# Patient Record
Sex: FEMALE | Race: WHITE | NOT HISPANIC OR LATINO | Employment: FULL TIME | URBAN - METROPOLITAN AREA
[De-identification: names, ages, dates, MRNs, and addresses within clinical notes are randomized per-mention and may not be internally consistent; named-entity substitution may affect disease eponyms.]

---

## 2017-01-18 ENCOUNTER — TRANSCRIBE ORDERS (OUTPATIENT)
Dept: ADMINISTRATIVE | Facility: HOSPITAL | Age: 42
End: 2017-01-18

## 2017-01-18 DIAGNOSIS — Z12.31 VISIT FOR SCREENING MAMMOGRAM: Primary | ICD-10-CM

## 2017-02-14 ENCOUNTER — HOSPITAL ENCOUNTER (OUTPATIENT)
Dept: MAMMOGRAPHY | Facility: HOSPITAL | Age: 42
Discharge: HOME | End: 2017-02-14
Admitting: PHYSICIAN ASSISTANT

## 2017-02-14 DIAGNOSIS — Z12.31 VISIT FOR SCREENING MAMMOGRAM: ICD-10-CM

## 2017-02-14 PROCEDURE — 77063 BREAST TOMOSYNTHESIS BI: CPT | Performed by: RADIOLOGY

## 2017-02-14 PROCEDURE — G0202 SCR MAMMO BI INCL CAD: HCPCS

## 2017-02-14 PROCEDURE — 77063 BREAST TOMOSYNTHESIS BI: CPT

## 2017-02-14 PROCEDURE — 77067 SCR MAMMO BI INCL CAD: CPT | Performed by: RADIOLOGY

## 2017-10-05 ENCOUNTER — TRANSCRIBE ORDERS (OUTPATIENT)
Dept: ADMINISTRATIVE | Facility: HOSPITAL | Age: 42
End: 2017-10-05

## 2017-10-05 DIAGNOSIS — R92.8 ABNORMAL MAMMOGRAM: Primary | ICD-10-CM

## 2017-10-09 ENCOUNTER — TRANSCRIBE ORDERS (OUTPATIENT)
Dept: ADMINISTRATIVE | Facility: HOSPITAL | Age: 42
End: 2017-10-09

## 2017-10-09 DIAGNOSIS — IMO0002 MASS: Primary | ICD-10-CM

## 2017-10-24 ENCOUNTER — HOSPITAL ENCOUNTER (OUTPATIENT)
Dept: ULTRASOUND IMAGING | Facility: HOSPITAL | Age: 42
Discharge: HOME | End: 2017-10-24

## 2017-10-24 ENCOUNTER — APPOINTMENT (OUTPATIENT)
Dept: MAMMOGRAPHY | Facility: HOSPITAL | Age: 42
End: 2017-10-24

## 2017-10-24 ENCOUNTER — TRANSCRIBE ORDERS (OUTPATIENT)
Dept: MAMMOGRAPHY | Facility: HOSPITAL | Age: 42
End: 2017-10-24

## 2017-10-24 ENCOUNTER — HOSPITAL ENCOUNTER (OUTPATIENT)
Dept: MAMMOGRAPHY | Facility: HOSPITAL | Age: 42
Discharge: HOME | End: 2017-10-24
Admitting: PHYSICIAN ASSISTANT

## 2017-10-24 DIAGNOSIS — IMO0002 MASS: ICD-10-CM

## 2017-10-24 DIAGNOSIS — Z12.31 VISIT FOR SCREENING MAMMOGRAM: Primary | ICD-10-CM

## 2017-10-24 PROCEDURE — 76642 ULTRASOUND BREAST LIMITED: CPT

## 2017-10-24 PROCEDURE — G0279 TOMOSYNTHESIS, MAMMO: HCPCS

## 2017-10-24 PROCEDURE — 77061 BREAST TOMOSYNTHESIS UNI: CPT | Performed by: RADIOLOGY

## 2017-10-24 PROCEDURE — 76642 ULTRASOUND BREAST LIMITED: CPT | Performed by: RADIOLOGY

## 2017-10-24 PROCEDURE — 77065 DX MAMMO INCL CAD UNI: CPT | Performed by: RADIOLOGY

## 2017-10-24 PROCEDURE — G0206 DX MAMMO INCL CAD UNI: HCPCS

## 2018-02-19 ENCOUNTER — HOSPITAL ENCOUNTER (OUTPATIENT)
Dept: MAMMOGRAPHY | Facility: HOSPITAL | Age: 43
Discharge: HOME | End: 2018-02-19
Admitting: PHYSICIAN ASSISTANT

## 2018-02-19 DIAGNOSIS — Z12.31 VISIT FOR SCREENING MAMMOGRAM: ICD-10-CM

## 2018-02-19 PROCEDURE — 77063 BREAST TOMOSYNTHESIS BI: CPT

## 2018-02-19 PROCEDURE — 77067 SCR MAMMO BI INCL CAD: CPT

## 2018-02-19 PROCEDURE — 77067 SCR MAMMO BI INCL CAD: CPT | Performed by: RADIOLOGY

## 2018-02-19 PROCEDURE — 77063 BREAST TOMOSYNTHESIS BI: CPT | Performed by: RADIOLOGY

## 2019-06-07 ENCOUNTER — TRANSCRIBE ORDERS (OUTPATIENT)
Dept: ADMINISTRATIVE | Facility: HOSPITAL | Age: 44
End: 2019-06-07

## 2019-06-07 DIAGNOSIS — Z12.31 VISIT FOR SCREENING MAMMOGRAM: Primary | ICD-10-CM

## 2019-07-17 ENCOUNTER — APPOINTMENT (OUTPATIENT)
Dept: MAMMOGRAPHY | Facility: HOSPITAL | Age: 44
End: 2019-07-17

## 2019-08-27 ENCOUNTER — HOSPITAL ENCOUNTER (OUTPATIENT)
Dept: MAMMOGRAPHY | Facility: HOSPITAL | Age: 44
Discharge: HOME | End: 2019-08-27
Admitting: PHYSICIAN ASSISTANT

## 2019-08-27 DIAGNOSIS — Z12.31 VISIT FOR SCREENING MAMMOGRAM: ICD-10-CM

## 2019-08-27 PROCEDURE — 77067 SCR MAMMO BI INCL CAD: CPT

## 2019-08-27 PROCEDURE — 77063 BREAST TOMOSYNTHESIS BI: CPT

## 2019-08-27 PROCEDURE — 77063 BREAST TOMOSYNTHESIS BI: CPT | Performed by: RADIOLOGY

## 2019-08-27 PROCEDURE — 77067 SCR MAMMO BI INCL CAD: CPT | Performed by: RADIOLOGY

## 2019-09-19 ENCOUNTER — HOSPITAL ENCOUNTER (OUTPATIENT)
Dept: ULTRASOUND IMAGING | Facility: HOSPITAL | Age: 44
Discharge: HOME | End: 2019-09-19

## 2019-09-19 ENCOUNTER — HOSPITAL ENCOUNTER (OUTPATIENT)
Dept: MAMMOGRAPHY | Facility: HOSPITAL | Age: 44
Discharge: HOME | End: 2019-09-19
Admitting: RADIOLOGY

## 2019-09-19 DIAGNOSIS — R92.8 ABNORMAL MAMMOGRAM: ICD-10-CM

## 2019-09-19 PROCEDURE — 76642 ULTRASOUND BREAST LIMITED: CPT

## 2019-09-19 PROCEDURE — 77066 DX MAMMO INCL CAD BI: CPT

## 2019-09-19 PROCEDURE — 77062 BREAST TOMOSYNTHESIS BI: CPT | Performed by: RADIOLOGY

## 2019-09-19 PROCEDURE — 77066 DX MAMMO INCL CAD BI: CPT | Performed by: RADIOLOGY

## 2019-09-19 PROCEDURE — G0279 TOMOSYNTHESIS, MAMMO: HCPCS

## 2019-09-19 PROCEDURE — 76642 ULTRASOUND BREAST LIMITED: CPT | Performed by: RADIOLOGY

## 2020-11-11 ENCOUNTER — TRANSCRIBE ORDERS (OUTPATIENT)
Dept: ADMINISTRATIVE | Facility: HOSPITAL | Age: 45
End: 2020-11-11

## 2020-11-11 DIAGNOSIS — Z12.31 VISIT FOR SCREENING MAMMOGRAM: Primary | ICD-10-CM

## 2022-11-21 RX ORDER — NORETHINDRONE ACETATE AND ETHINYL ESTRADIOL 1MG-20(21)
KIT ORAL
COMMUNITY
Start: 2014-07-08 | End: 2022-12-28

## 2022-11-21 RX ORDER — ESCITALOPRAM OXALATE 10 MG/1
10 TABLET ORAL DAILY
COMMUNITY
Start: 2022-08-14 | End: 2022-11-21 | Stop reason: SDUPTHER

## 2022-11-21 RX ORDER — ESCITALOPRAM OXALATE 10 MG/1
10 TABLET ORAL DAILY
Qty: 30 TABLET | Refills: 0 | Status: SHIPPED | OUTPATIENT
Start: 2022-11-21 | End: 2022-12-20 | Stop reason: SDUPTHER

## 2022-11-21 RX ORDER — ESCITALOPRAM OXALATE 10 MG/1
TABLET ORAL
Qty: 90 TABLET | Refills: 0 | OUTPATIENT
Start: 2022-11-21

## 2022-11-21 NOTE — TELEPHONE ENCOUNTER
Called patient scheduled physical for 12/15/22 that was the earliest she will run out of lexapro in 2 days goes to [facility] in [facility]

## 2022-11-21 NOTE — TELEPHONE ENCOUNTER
Refill request for lexapro. Has not been seen since 11/24/21. Due for for physical 11/24/22. Should not run out until then. Please schedule

## 2022-12-20 ENCOUNTER — TELEPHONE (OUTPATIENT)
Dept: FAMILY MEDICINE CLINIC | Facility: CLINIC | Age: 47
End: 2022-12-20

## 2022-12-20 RX ORDER — ESCITALOPRAM OXALATE 10 MG/1
10 TABLET ORAL DAILY
Qty: 30 TABLET | Refills: 0 | Status: SHIPPED | OUTPATIENT
Start: 2022-12-20 | End: 2022-12-28 | Stop reason: SDUPTHER

## 2022-12-20 NOTE — TELEPHONE ENCOUNTER
Patient will be out of LEXAPRO before her appointment we had to reschedule. Can you please refill medication till patient is able to be seen on 12.28.22. Thanks.

## 2022-12-28 ENCOUNTER — OFFICE VISIT (OUTPATIENT)
Dept: FAMILY MEDICINE CLINIC | Facility: CLINIC | Age: 47
End: 2022-12-28

## 2022-12-28 VITALS
WEIGHT: 222 LBS | HEART RATE: 78 BPM | SYSTOLIC BLOOD PRESSURE: 122 MMHG | OXYGEN SATURATION: 99 % | HEIGHT: 69 IN | BODY MASS INDEX: 32.88 KG/M2 | DIASTOLIC BLOOD PRESSURE: 92 MMHG

## 2022-12-28 DIAGNOSIS — Z13.1 SCREENING FOR DIABETES MELLITUS: ICD-10-CM

## 2022-12-28 DIAGNOSIS — Z71.3 DIETARY COUNSELING AND SURVEILLANCE: ICD-10-CM

## 2022-12-28 DIAGNOSIS — F41.1 GENERALIZED ANXIETY DISORDER: ICD-10-CM

## 2022-12-28 DIAGNOSIS — E56.9 VITAMIN DEFICIENCY: ICD-10-CM

## 2022-12-28 DIAGNOSIS — Z13.220 SCREENING FOR LIPID DISORDERS: ICD-10-CM

## 2022-12-28 DIAGNOSIS — Z12.11 SCREENING FOR COLON CANCER: ICD-10-CM

## 2022-12-28 DIAGNOSIS — Z00.00 ROUTINE MEDICAL EXAM: Primary | ICD-10-CM

## 2022-12-28 DIAGNOSIS — J30.1 NON-SEASONAL ALLERGIC RHINITIS DUE TO POLLEN: ICD-10-CM

## 2022-12-28 PROCEDURE — 99396 PREV VISIT EST AGE 40-64: CPT | Performed by: NURSE PRACTITIONER

## 2022-12-28 RX ORDER — CETIRIZINE HYDROCHLORIDE 10 MG/1
10 TABLET ORAL DAILY
Qty: 90 TABLET | Refills: 3 | Status: SHIPPED | OUTPATIENT
Start: 2022-12-28

## 2022-12-28 RX ORDER — ESCITALOPRAM OXALATE 10 MG/1
10 TABLET ORAL DAILY
Qty: 90 TABLET | Refills: 3 | Status: SHIPPED | OUTPATIENT
Start: 2022-12-28

## 2022-12-28 RX ORDER — ALBUTEROL SULFATE 90 UG/1
AEROSOL, METERED RESPIRATORY (INHALATION)
COMMUNITY
Start: 2022-12-18

## 2022-12-28 RX ORDER — MONTELUKAST SODIUM 10 MG/1
10 TABLET ORAL NIGHTLY
Qty: 90 TABLET | Refills: 3 | Status: SHIPPED | OUTPATIENT
Start: 2022-12-28

## 2022-12-28 RX ORDER — SEMAGLUTIDE 0.25 MG/.5ML
0.25 INJECTION, SOLUTION SUBCUTANEOUS WEEKLY
Qty: 2 ML | Refills: 0 | Status: SHIPPED | OUTPATIENT
Start: 2022-12-28 | End: 2023-01-10 | Stop reason: SDUPTHER

## 2022-12-28 NOTE — PROGRESS NOTES
"Chief Complaint  Annual Exam    Subjective          Cami Back presents to [facility] for preventative yearly exam.   History of Present Illness  Pt is here for a physical exam and medication refills. She is taking Lexapro and doing well. She has had allergies that seem worse than usual. She has tried to lose weight with diet and exercise but has not been able to do so.       Objective   Vital Signs:   /92   Pulse 78   Ht 175.3 cm (69\")   Wt 101 kg (222 lb)   SpO2 99%   BMI 32.78 kg/m²     Body mass index is 32.78 kg/m².    Predictive Model Details   No score data available for Risk of Fall        PHQ-9 Depression Screening  Little interest or pleasure in doing things? 0-->not at all   Feeling down, depressed, or hopeless? 0-->not at all   Trouble falling or staying asleep, or sleeping too much?     Feeling tired or having little energy?     Poor appetite or overeating?     Feeling bad about yourself - or that you are a failure or have let yourself or your family down?     Trouble concentrating on things, such as reading the newspaper or watching television?     Moving or speaking so slowly that other people could have noticed? Or the opposite - being so fidgety or restless that you have been moving around a lot more than usual?     Thoughts that you would be better off dead, or of hurting yourself in some way?     PHQ-9 Total Score 0   If you checked off any problems, how difficult have these problems made it for you to do your work, take care of things at home, or get along with other people?       Health Maintenance   Topic Date Due   • COLORECTAL CANCER SCREENING  Never done   • HEPATITIS C SCREENING  Never done   • COVID-19 Vaccine (3 - Booster for Moderna series) 12/28/2023 (Originally 4/30/2021)   • ANNUAL PHYSICAL  12/28/2023   • PAP SMEAR  06/28/2025   • TDAP/TD VACCINES (2 - Td or Tdap) 07/14/2026   • INFLUENZA VACCINE  Completed   • Pneumococcal Vaccine " 0-64  Aged Out        Immunization History   Administered Date(s) Administered   • COVID-19 (MODERNA) 1st, 2nd, 3rd Dose Only 02/05/2021, 03/05/2021   • Hepatitis A 09/13/2018, 03/21/2019   • Influenza, Unspecified 10/01/2007, 10/03/2019, 10/06/2020, 10/06/2021, 10/05/2022   • Tdap 07/14/2016       Review of Systems   Constitutional: Negative for fatigue and fever.   Respiratory: Negative for shortness of breath.    Cardiovascular: Negative for chest pain, palpitations and leg swelling.   Neurological: Negative for syncope.   Psychiatric/Behavioral: The patient is not nervous/anxious.         Past History:  Medical History: has a past medical history of Anxiety.   Surgical History: has no past surgical history on file.   Family History: family history includes Breast cancer (age of onset: 65) in her mother; Breast cancer (age of onset: 78) in her maternal grandmother; Ovarian cancer in her maternal grandmother.   Social History: reports that she has never smoked. She has never used smokeless tobacco. She reports current alcohol use. She reports that she does not use drugs.       Allergies: Bee venom    Physical Exam  Constitutional:       Appearance: Normal appearance.   HENT:      Head: Normocephalic.   Eyes:      Conjunctiva/sclera: Conjunctivae normal.      Pupils: Pupils are equal, round, and reactive to light.   Cardiovascular:      Rate and Rhythm: Normal rate and regular rhythm.      Heart sounds: Normal heart sounds.   Pulmonary:      Effort: Pulmonary effort is normal.      Breath sounds: Normal breath sounds.   Abdominal:      Tenderness: There is no abdominal tenderness.   Musculoskeletal:         General: Normal range of motion.   Skin:     General: Skin is warm and dry.      Capillary Refill: Capillary refill takes less than 2 seconds.   Neurological:      General: No focal deficit present.      Mental Status: She is alert and oriented to person, place, and time.   Psychiatric:         Mood and Affect:  Mood normal.         Behavior: Behavior normal.         Thought Content: Thought content normal.         Judgment: Judgment normal.          Result Review :                   Assessment and Plan    Diagnoses and all orders for this visit:    1. Routine medical exam (Primary)  Comments:  We discussed diet, exercise, and prev counseling. UTD on mammo/pap. Cologuard ordered.   Orders:  -     CBC & Differential; Future  -     Comprehensive Metabolic Panel; Future  -     TSH; Future    2. Screening for diabetes mellitus  -     Hemoglobin A1c; Future    3. Screening for lipid disorders  -     Lipid Panel; Future    4. Vitamin deficiency  -     Vitamin B12; Future  -     Vitamin D,25-Hydroxy; Future  -     Folate; Future    5. Screening for colon cancer  -     Cologuard - Stool, Per Rectum; Future    6. Generalized anxiety disorder  Comments:  Continue current meds.   Orders:  -     escitalopram (LEXAPRO) 10 MG tablet; Take 1 tablet by mouth Daily.  Dispense: 90 tablet; Refill: 3    7. Non-seasonal allergic rhinitis due to pollen  Comments:  Try Singulair. Continue Zyrtec. RTC if not improving in 1 month.   Orders:  -     montelukast (Singulair) 10 MG tablet; Take 1 tablet by mouth Every Night.  Dispense: 90 tablet; Refill: 3  -     cetirizine (zyrTEC) 10 MG tablet; Take 1 tablet by mouth Daily.  Dispense: 90 tablet; Refill: 3    8. Dietary counseling and surveillance  Comments:  Begin Wegovy. 1200 ray/day diet and exercise 3-5 days per week.   Orders:  -     Semaglutide-Weight Management (Wegovy) 0.25 MG/0.5ML solution auto-injector; Inject 0.25 mg under the skin into the appropriate area as directed 1 (One) Time Per Week.  Dispense: 2 mL; Refill: 0    9. BMI 32.0-32.9,adult  -     Semaglutide-Weight Management (Wegovy) 0.25 MG/0.5ML solution auto-injector; Inject 0.25 mg under the skin into the appropriate area as directed 1 (One) Time Per Week.  Dispense: 2 mL; Refill: 0                Current Outpatient Medications:    •  albuterol sulfate  (90 Base) MCG/ACT inhaler, INHALE 2 PUFFS BY MOUTH EVERY 4 TO 6 HOURS AS NEEDED FOR WHEEZING /SHORTNESS OF AIR, Disp: , Rfl:   •  escitalopram (LEXAPRO) 10 MG tablet, Take 1 tablet by mouth Daily., Disp: 90 tablet, Rfl: 3  •  cetirizine (zyrTEC) 10 MG tablet, Take 1 tablet by mouth Daily., Disp: 90 tablet, Rfl: 3  •  montelukast (Singulair) 10 MG tablet, Take 1 tablet by mouth Every Night., Disp: 90 tablet, Rfl: 3  •  Semaglutide-Weight Management (Wegovy) 0.25 MG/0.5ML solution auto-injector, Inject 0.25 mg under the skin into the appropriate area as directed 1 (One) Time Per Week., Disp: 2 mL, Rfl: 0    Follow Up   Return in about 1 year (around 12/28/2023) for Annual physical.  Patient was given instructions and counseling regarding her condition or for health maintenance advice. Please see specific information pulled into the AVS if appropriate.     Blanca Torres, LAYTON

## 2023-01-02 ENCOUNTER — PATIENT MESSAGE (OUTPATIENT)
Dept: FAMILY MEDICINE CLINIC | Facility: CLINIC | Age: 48
End: 2023-01-02
Payer: COMMERCIAL

## 2023-01-02 DIAGNOSIS — Z71.3 DIETARY COUNSELING AND SURVEILLANCE: ICD-10-CM

## 2023-01-10 RX ORDER — SEMAGLUTIDE 0.25 MG/.5ML
0.25 INJECTION, SOLUTION SUBCUTANEOUS WEEKLY
Qty: 2 ML | Refills: 0 | Status: SHIPPED | OUTPATIENT
Start: 2023-01-10 | End: 2023-01-10

## 2023-01-10 RX ORDER — SEMAGLUTIDE 0.5 MG/.5ML
0.5 INJECTION, SOLUTION SUBCUTANEOUS WEEKLY
Qty: 2 ML | Refills: 0 | Status: SHIPPED | OUTPATIENT
Start: 2023-01-10 | End: 2023-01-26

## 2023-01-10 NOTE — TELEPHONE ENCOUNTER
Can you send in the 5mg? I resent what we had in her chart, but realized this is not what she needed.

## 2023-01-16 ENCOUNTER — LAB (OUTPATIENT)
Dept: FAMILY MEDICINE CLINIC | Facility: CLINIC | Age: 48
End: 2023-01-16
Payer: COMMERCIAL

## 2023-01-16 DIAGNOSIS — E56.9 VITAMIN DEFICIENCY: ICD-10-CM

## 2023-01-16 DIAGNOSIS — Z00.00 ROUTINE MEDICAL EXAM: ICD-10-CM

## 2023-01-16 DIAGNOSIS — Z13.220 SCREENING FOR LIPID DISORDERS: ICD-10-CM

## 2023-01-16 DIAGNOSIS — Z13.1 SCREENING FOR DIABETES MELLITUS: ICD-10-CM

## 2023-01-16 PROCEDURE — 36415 COLL VENOUS BLD VENIPUNCTURE: CPT | Performed by: NURSE PRACTITIONER

## 2023-01-17 LAB
ALBUMIN SERPL-MCNC: 4.4 G/DL (ref 3.8–4.8)
ALBUMIN/GLOB SERPL: 2 {RATIO} (ref 1.2–2.2)
ALP SERPL-CCNC: 97 IU/L (ref 44–121)
ALT SERPL-CCNC: 19 IU/L (ref 0–32)
AST SERPL-CCNC: 25 IU/L (ref 0–40)
BASOPHILS # BLD AUTO: 0 X10E3/UL (ref 0–0.2)
BASOPHILS NFR BLD AUTO: 1 %
BILIRUB SERPL-MCNC: 1 MG/DL (ref 0–1.2)
BUN SERPL-MCNC: 7 MG/DL (ref 6–24)
BUN/CREAT SERPL: 9 (ref 9–23)
CALCIUM SERPL-MCNC: 9.4 MG/DL (ref 8.7–10.2)
CHLORIDE SERPL-SCNC: 103 MMOL/L (ref 96–106)
CHOLEST SERPL-MCNC: 156 MG/DL (ref 100–199)
CO2 SERPL-SCNC: 21 MMOL/L (ref 20–29)
CREAT SERPL-MCNC: 0.78 MG/DL (ref 0.57–1)
EGFRCR SERPLBLD CKD-EPI 2021: 94 ML/MIN/1.73
EOSINOPHIL # BLD AUTO: 0.1 X10E3/UL (ref 0–0.4)
EOSINOPHIL NFR BLD AUTO: 1 %
ERYTHROCYTE [DISTWIDTH] IN BLOOD BY AUTOMATED COUNT: 12.3 % (ref 11.7–15.4)
FOLATE SERPL-MCNC: >20 NG/ML
GLOBULIN SER CALC-MCNC: 2.2 G/DL (ref 1.5–4.5)
GLUCOSE SERPL-MCNC: 81 MG/DL (ref 70–99)
HBA1C MFR BLD: 5 % (ref 4.8–5.6)
HCT VFR BLD AUTO: 43.6 % (ref 34–46.6)
HDLC SERPL-MCNC: 52 MG/DL
HGB BLD-MCNC: 14.5 G/DL (ref 11.1–15.9)
IMM GRANULOCYTES # BLD AUTO: 0 X10E3/UL (ref 0–0.1)
IMM GRANULOCYTES NFR BLD AUTO: 0 %
LDLC SERPL CALC-MCNC: 87 MG/DL (ref 0–99)
LYMPHOCYTES # BLD AUTO: 1.5 X10E3/UL (ref 0.7–3.1)
LYMPHOCYTES NFR BLD AUTO: 21 %
MCH RBC QN AUTO: 31.5 PG (ref 26.6–33)
MCHC RBC AUTO-ENTMCNC: 33.3 G/DL (ref 31.5–35.7)
MCV RBC AUTO: 95 FL (ref 79–97)
MONOCYTES # BLD AUTO: 0.6 X10E3/UL (ref 0.1–0.9)
MONOCYTES NFR BLD AUTO: 8 %
NEUTROPHILS # BLD AUTO: 4.9 X10E3/UL (ref 1.4–7)
NEUTROPHILS NFR BLD AUTO: 69 %
PLATELET # BLD AUTO: 272 X10E3/UL (ref 150–450)
POTASSIUM SERPL-SCNC: 4.6 MMOL/L (ref 3.5–5.2)
PROT SERPL-MCNC: 6.6 G/DL (ref 6–8.5)
RBC # BLD AUTO: 4.6 X10E6/UL (ref 3.77–5.28)
SODIUM SERPL-SCNC: 140 MMOL/L (ref 134–144)
TRIGL SERPL-MCNC: 88 MG/DL (ref 0–149)
TSH SERPL DL<=0.005 MIU/L-ACNC: 1.21 UIU/ML (ref 0.45–4.5)
VIT B12 SERPL-MCNC: 1158 PG/ML (ref 232–1245)
VLDLC SERPL CALC-MCNC: 17 MG/DL (ref 5–40)
WBC # BLD AUTO: 7.1 X10E3/UL (ref 3.4–10.8)

## 2023-01-18 LAB — 25(OH)D3+25(OH)D2 SERPL-MCNC: 43.8 NG/ML (ref 30–100)

## 2023-01-25 RX ORDER — SEMAGLUTIDE 0.5 MG/.5ML
0.5 INJECTION, SOLUTION SUBCUTANEOUS WEEKLY
Qty: 2 ML | Refills: 0 | Status: CANCELLED | OUTPATIENT
Start: 2023-01-25

## 2023-01-26 RX ORDER — SEMAGLUTIDE 1 MG/.5ML
1 INJECTION, SOLUTION SUBCUTANEOUS WEEKLY
Qty: 2 ML | Refills: 0 | Status: SHIPPED | OUTPATIENT
Start: 2023-01-26 | End: 2023-03-02

## 2023-02-27 RX ORDER — SEMAGLUTIDE 1 MG/.5ML
1 INJECTION, SOLUTION SUBCUTANEOUS WEEKLY
Qty: 2 ML | Refills: 0 | OUTPATIENT
Start: 2023-02-27

## 2023-03-02 RX ORDER — SEMAGLUTIDE 1 MG/.5ML
1 INJECTION, SOLUTION SUBCUTANEOUS WEEKLY
Qty: 2 ML | Refills: 0 | OUTPATIENT
Start: 2023-03-02

## 2023-03-02 RX ORDER — SEMAGLUTIDE 1.7 MG/.75ML
1.7 INJECTION, SOLUTION SUBCUTANEOUS WEEKLY
Qty: 3 ML | Refills: 0 | Status: SHIPPED | OUTPATIENT
Start: 2023-03-02 | End: 2023-03-31

## 2023-03-30 RX ORDER — SEMAGLUTIDE 1.7 MG/.75ML
1.7 INJECTION, SOLUTION SUBCUTANEOUS WEEKLY
Qty: 3 ML | Refills: 0 | Status: CANCELLED | OUTPATIENT
Start: 2023-03-30

## 2023-03-31 RX ORDER — SEMAGLUTIDE 2.4 MG/.75ML
2.4 INJECTION, SOLUTION SUBCUTANEOUS WEEKLY
Qty: 3 ML | Refills: 2 | Status: SHIPPED | OUTPATIENT
Start: 2023-03-31

## 2023-06-09 RX ORDER — SEMAGLUTIDE 2.4 MG/.75ML
INJECTION, SOLUTION SUBCUTANEOUS
Qty: 4 ML | Refills: 0 | Status: SHIPPED | OUTPATIENT
Start: 2023-06-09

## 2023-12-13 RX ORDER — SEMAGLUTIDE 2.4 MG/.75ML
2.4 INJECTION, SOLUTION SUBCUTANEOUS WEEKLY
Qty: 3 ML | Refills: 1 | Status: SHIPPED | OUTPATIENT
Start: 2023-12-13

## 2023-12-28 RX ORDER — SEMAGLUTIDE 2.4 MG/.75ML
2.4 INJECTION, SOLUTION SUBCUTANEOUS WEEKLY
Qty: 3 ML | Refills: 0 | Status: SHIPPED | OUTPATIENT
Start: 2023-12-28

## 2024-01-15 ENCOUNTER — OFFICE VISIT (OUTPATIENT)
Dept: FAMILY MEDICINE CLINIC | Facility: CLINIC | Age: 49
End: 2024-01-15
Payer: COMMERCIAL

## 2024-01-15 VITALS
OXYGEN SATURATION: 100 % | WEIGHT: 155 LBS | DIASTOLIC BLOOD PRESSURE: 90 MMHG | BODY MASS INDEX: 22.96 KG/M2 | SYSTOLIC BLOOD PRESSURE: 122 MMHG | HEART RATE: 100 BPM | HEIGHT: 69 IN

## 2024-01-15 DIAGNOSIS — E55.9 VITAMIN D DEFICIENCY: ICD-10-CM

## 2024-01-15 DIAGNOSIS — J30.1 NON-SEASONAL ALLERGIC RHINITIS DUE TO POLLEN: ICD-10-CM

## 2024-01-15 DIAGNOSIS — Z13.220 SCREENING FOR LIPID DISORDERS: ICD-10-CM

## 2024-01-15 DIAGNOSIS — E56.9 VITAMIN DEFICIENCY: ICD-10-CM

## 2024-01-15 DIAGNOSIS — F41.1 GENERALIZED ANXIETY DISORDER: ICD-10-CM

## 2024-01-15 DIAGNOSIS — Z71.3 DIETARY COUNSELING AND SURVEILLANCE: ICD-10-CM

## 2024-01-15 DIAGNOSIS — Z00.00 ROUTINE MEDICAL EXAM: Primary | ICD-10-CM

## 2024-01-15 DIAGNOSIS — Z13.1 SCREENING FOR DIABETES MELLITUS: ICD-10-CM

## 2024-01-15 PROCEDURE — 99396 PREV VISIT EST AGE 40-64: CPT | Performed by: NURSE PRACTITIONER

## 2024-01-15 RX ORDER — ESCITALOPRAM OXALATE 10 MG/1
10 TABLET ORAL DAILY
Qty: 90 TABLET | Refills: 3 | Status: SHIPPED | OUTPATIENT
Start: 2024-01-15

## 2024-01-15 RX ORDER — CETIRIZINE HYDROCHLORIDE 10 MG/1
10 TABLET ORAL DAILY
Qty: 90 TABLET | Refills: 3 | Status: SHIPPED | OUTPATIENT
Start: 2024-01-15

## 2024-01-15 RX ORDER — SEMAGLUTIDE 2.4 MG/.75ML
2.4 INJECTION, SOLUTION SUBCUTANEOUS WEEKLY
Qty: 3 ML | Refills: 5 | Status: SHIPPED | OUTPATIENT
Start: 2024-01-15

## 2024-01-15 NOTE — PROGRESS NOTES
"Chief Complaint  weight management and Annual Exam    Subjective          Cami Back presents to [facility] for preventative yearly exam.   History of Present Illness  Pt is here for a physical exam and medication refills. She is doing well on Wegovy. She is watching her diet and exercising. She has had menstrual migraines for the past year.       Objective   Vital Signs:   /90   Pulse 100   Ht 175.3 cm (69\")   Wt 70.3 kg (155 lb)   SpO2 100%   BMI 22.89 kg/m²     Body mass index is 22.89 kg/m².    Predictive Model Details   No score data available for Risk of Fall        PHQ-9 Depression Screening  Little interest or pleasure in doing things?     Feeling down, depressed, or hopeless?     Trouble falling or staying asleep, or sleeping too much?     Feeling tired or having little energy?     Poor appetite or overeating?     Feeling bad about yourself - or that you are a failure or have let yourself or your family down?     Trouble concentrating on things, such as reading the newspaper or watching television?     Moving or speaking so slowly that other people could have noticed? Or the opposite - being so fidgety or restless that you have been moving around a lot more than usual?     Thoughts that you would be better off dead, or of hurting yourself in some way?     PHQ-9 Total Score     If you checked off any problems, how difficult have these problems made it for you to do your work, take care of things at home, or get along with other people?       Health Maintenance   Topic Date Due    HEPATITIS C SCREENING  Never done    INFLUENZA VACCINE  08/01/2023    COVID-19 Vaccine (3 - 2023-24 season) 09/01/2023    ANNUAL PHYSICAL  12/28/2023    PAP SMEAR  06/28/2025    COLORECTAL CANCER SCREENING  01/04/2026    TDAP/TD VACCINES (2 - Td or Tdap) 07/14/2026    Pneumococcal Vaccine 0-64  Aged Out        Immunization History   Administered Date(s) Administered    COVID-19 " (MODERNA) 1st,2nd,3rd Dose Monovalent 02/05/2021, 03/05/2021    Hepatitis A 09/13/2018, 03/21/2019    Influenza, Unspecified 10/01/2007, 10/03/2019, 10/06/2020, 10/06/2021, 10/05/2022    Tdap 07/14/2016       Review of Systems   Constitutional:  Negative for fatigue and fever.   Respiratory:  Negative for shortness of breath.    Cardiovascular:  Negative for chest pain, palpitations and leg swelling.   Neurological:  Negative for syncope.   Psychiatric/Behavioral:  The patient is not nervous/anxious.         Past History:  Medical History: has a past medical history of Allergic (8/2022) and Anxiety.   Surgical History: has no past surgical history on file.   Family History: family history includes Anxiety disorder in her father, mother, and sister; Breast cancer (age of onset: 65) in her mother; Breast cancer (age of onset: 78) in her maternal grandmother; Cancer in her mother; Diabetes in her father and sister; Heart disease in her father and mother; Hyperlipidemia in her sister; Ovarian cancer in her maternal grandmother.   Social History: reports that she has never smoked. She has never used smokeless tobacco. She reports current alcohol use of about 20.0 standard drinks of alcohol per week. She reports that she does not use drugs.       Allergies: Bee venom    Physical Exam  Constitutional:       Appearance: Normal appearance.   HENT:      Head: Normocephalic.   Eyes:      Conjunctiva/sclera: Conjunctivae normal.      Pupils: Pupils are equal, round, and reactive to light.   Cardiovascular:      Rate and Rhythm: Normal rate and regular rhythm.      Heart sounds: Normal heart sounds.   Pulmonary:      Effort: Pulmonary effort is normal.      Breath sounds: Normal breath sounds.   Abdominal:      Tenderness: There is no abdominal tenderness.   Musculoskeletal:         General: Normal range of motion.   Skin:     General: Skin is warm and dry.      Capillary Refill: Capillary refill takes less than 2 seconds.    Neurological:      General: No focal deficit present.      Mental Status: She is alert and oriented to person, place, and time.   Psychiatric:         Mood and Affect: Mood normal.         Behavior: Behavior normal.         Thought Content: Thought content normal.         Judgment: Judgment normal.          Result Review :                   Assessment and Plan    Diagnoses and all orders for this visit:    1. Routine medical exam (Primary)  Comments:  We discussed diet, exercise, and prev counseling. UTD on mammo/colon screen. Will schedule pap.  Orders:  -     CBC & Differential  -     Comprehensive Metabolic Panel  -     TSH    2. Generalized anxiety disorder  Comments:  Continue current meds.   Orders:  -     escitalopram (LEXAPRO) 10 MG tablet; Take 1 tablet by mouth Daily.  Dispense: 90 tablet; Refill: 3    3. Non-seasonal allergic rhinitis due to pollen  Comments:  Continue Zyrtec.  Orders:  -     cetirizine (zyrTEC) 10 MG tablet; Take 1 tablet by mouth Daily.  Dispense: 90 tablet; Refill: 3    4. Screening for diabetes mellitus  -     Hemoglobin A1c    5. Screening for lipid disorders  -     Lipid Panel    6. Vitamin D deficiency  -     Vitamin D,25-Hydroxy    7. Vitamin deficiency  -     Vitamin B12  -     Folate    8. Dietary counseling and surveillance  Comments:  Continue Wegovy for maintenance.  Orders:  -     Semaglutide-Weight Management (Wegovy) 2.4 MG/0.75ML solution auto-injector; Inject 2.4 mg under the skin into the appropriate area as directed 1 (One) Time Per Week.  Dispense: 3 mL; Refill: 5                Current Outpatient Medications:     albuterol sulfate  (90 Base) MCG/ACT inhaler, INHALE 2 PUFFS BY MOUTH EVERY 4 TO 6 HOURS AS NEEDED FOR WHEEZING /SHORTNESS OF AIR, Disp: , Rfl:     cetirizine (zyrTEC) 10 MG tablet, Take 1 tablet by mouth Daily., Disp: 90 tablet, Rfl: 3    escitalopram (LEXAPRO) 10 MG tablet, Take 1 tablet by mouth Daily., Disp: 90 tablet, Rfl: 3     Semaglutide-Weight Management (Wegovy) 2.4 MG/0.75ML solution auto-injector, Inject 2.4 mg under the skin into the appropriate area as directed 1 (One) Time Per Week., Disp: 3 mL, Rfl: 5    Follow Up   Return in about 1 year (around 1/15/2025) for Annual physical.  Patient was given instructions and counseling regarding her condition or for health maintenance advice. Please see specific information pulled into the AVS if appropriate.     Blanca Torres, APRN

## 2024-01-16 LAB
25(OH)D3+25(OH)D2 SERPL-MCNC: 83.4 NG/ML (ref 30–100)
ALBUMIN SERPL-MCNC: 4.5 G/DL (ref 3.9–4.9)
ALBUMIN/GLOB SERPL: 2 {RATIO} (ref 1.2–2.2)
ALP SERPL-CCNC: 83 IU/L (ref 44–121)
ALT SERPL-CCNC: 16 IU/L (ref 0–32)
AST SERPL-CCNC: 17 IU/L (ref 0–40)
BASOPHILS # BLD AUTO: 0 X10E3/UL (ref 0–0.2)
BASOPHILS NFR BLD AUTO: 1 %
BILIRUB SERPL-MCNC: 0.7 MG/DL (ref 0–1.2)
BUN SERPL-MCNC: 7 MG/DL (ref 6–24)
BUN/CREAT SERPL: 9 (ref 9–23)
CALCIUM SERPL-MCNC: 9.6 MG/DL (ref 8.7–10.2)
CHLORIDE SERPL-SCNC: 103 MMOL/L (ref 96–106)
CHOLEST SERPL-MCNC: 128 MG/DL (ref 100–199)
CO2 SERPL-SCNC: 24 MMOL/L (ref 20–29)
CREAT SERPL-MCNC: 0.76 MG/DL (ref 0.57–1)
EGFRCR SERPLBLD CKD-EPI 2021: 97 ML/MIN/1.73
EOSINOPHIL # BLD AUTO: 0.1 X10E3/UL (ref 0–0.4)
EOSINOPHIL NFR BLD AUTO: 1 %
ERYTHROCYTE [DISTWIDTH] IN BLOOD BY AUTOMATED COUNT: 11.6 % (ref 11.7–15.4)
FOLATE SERPL-MCNC: 18.8 NG/ML
GLOBULIN SER CALC-MCNC: 2.3 G/DL (ref 1.5–4.5)
GLUCOSE SERPL-MCNC: 79 MG/DL (ref 70–99)
HBA1C MFR BLD: 4.8 % (ref 4.8–5.6)
HCT VFR BLD AUTO: 43 % (ref 34–46.6)
HDLC SERPL-MCNC: 47 MG/DL
HGB BLD-MCNC: 14.5 G/DL (ref 11.1–15.9)
IMM GRANULOCYTES # BLD AUTO: 0 X10E3/UL (ref 0–0.1)
IMM GRANULOCYTES NFR BLD AUTO: 0 %
LDLC SERPL CALC-MCNC: 64 MG/DL (ref 0–99)
LYMPHOCYTES # BLD AUTO: 1.6 X10E3/UL (ref 0.7–3.1)
LYMPHOCYTES NFR BLD AUTO: 20 %
MCH RBC QN AUTO: 31.8 PG (ref 26.6–33)
MCHC RBC AUTO-ENTMCNC: 33.7 G/DL (ref 31.5–35.7)
MCV RBC AUTO: 94 FL (ref 79–97)
MONOCYTES # BLD AUTO: 0.5 X10E3/UL (ref 0.1–0.9)
MONOCYTES NFR BLD AUTO: 7 %
NEUTROPHILS # BLD AUTO: 5.7 X10E3/UL (ref 1.4–7)
NEUTROPHILS NFR BLD AUTO: 71 %
PLATELET # BLD AUTO: 374 X10E3/UL (ref 150–450)
POTASSIUM SERPL-SCNC: 4.4 MMOL/L (ref 3.5–5.2)
PROT SERPL-MCNC: 6.8 G/DL (ref 6–8.5)
RBC # BLD AUTO: 4.56 X10E6/UL (ref 3.77–5.28)
SODIUM SERPL-SCNC: 143 MMOL/L (ref 134–144)
TRIGL SERPL-MCNC: 89 MG/DL (ref 0–149)
TSH SERPL DL<=0.005 MIU/L-ACNC: 1.46 UIU/ML (ref 0.45–4.5)
VIT B12 SERPL-MCNC: 1610 PG/ML (ref 232–1245)
VLDLC SERPL CALC-MCNC: 17 MG/DL (ref 5–40)
WBC # BLD AUTO: 8 X10E3/UL (ref 3.4–10.8)

## 2024-01-17 NOTE — PROGRESS NOTES
Your B12 was slightly elevated.  You can decrease your B12 supplement if you are taking one.  Everything else looked good.  Take care!

## 2024-07-12 DIAGNOSIS — Z71.3 DIETARY COUNSELING AND SURVEILLANCE: ICD-10-CM

## 2024-07-12 RX ORDER — SEMAGLUTIDE 2.4 MG/.75ML
INJECTION, SOLUTION SUBCUTANEOUS
Qty: 4 ML | Refills: 0 | OUTPATIENT
Start: 2024-07-12

## 2024-07-12 NOTE — TELEPHONE ENCOUNTER
Pt due for med recheck appt, will need to est with new PCP. Please call and schedule pt appt with new PCP.

## 2024-07-23 ENCOUNTER — OFFICE VISIT (OUTPATIENT)
Dept: FAMILY MEDICINE CLINIC | Facility: CLINIC | Age: 49
End: 2024-07-23
Payer: COMMERCIAL

## 2024-07-23 VITALS
WEIGHT: 150 LBS | OXYGEN SATURATION: 99 % | BODY MASS INDEX: 22.22 KG/M2 | SYSTOLIC BLOOD PRESSURE: 124 MMHG | DIASTOLIC BLOOD PRESSURE: 80 MMHG | HEART RATE: 88 BPM | HEIGHT: 69 IN

## 2024-07-23 DIAGNOSIS — Z71.3 DIETARY COUNSELING AND SURVEILLANCE: ICD-10-CM

## 2024-07-23 PROCEDURE — 99213 OFFICE O/P EST LOW 20 MIN: CPT | Performed by: NURSE PRACTITIONER

## 2024-07-23 RX ORDER — SEMAGLUTIDE 2.4 MG/.75ML
2.4 INJECTION, SOLUTION SUBCUTANEOUS WEEKLY
Qty: 3 ML | Refills: 5 | Status: SHIPPED | OUTPATIENT
Start: 2024-07-23

## 2024-07-23 NOTE — PROGRESS NOTES
"    Office Note     Name: Cami Back    : 1975     MRN: [id number]     Chief Complaint  Weight Loss (Pt is here for a refill on her wegovy ) and Establish Care    Subjective     History of Present Illness:  Cami Back is a 48 y.o. female who presents today for transferring care from [facility].     She is currently taking Wegovy for weight loss assistance. She is doing well with this, has lost about 70 pounds in the last year or so. She is on stable dosing, continues with her \"health \" via insurance to meet requirements. She is following a healthy diet and exercising. She denies questions or concerns.       Objective     Past Medical History:   Diagnosis Date    Allergic 2022    Bee allergy    Anxiety      Past Surgical History:   Procedure Laterality Date    EYE SURGERY  1993    Removed cyst from eye     Family History   Problem Relation Age of Onset    Breast cancer Mother 65    Anxiety disorder Mother     Cancer Mother         Breast Cancer    Heart disease Mother     Other Mother         Pagetts Disease    Heart defect Mother     Anxiety disorder Father     Diabetes Father     Heart disease Father     Other Father         Rheumatologist hasn’t diagnosed yet    Anxiety disorder Sister     Diabetes Sister     Hyperlipidemia Sister     Other Sister         Lupus    Breast cancer Maternal Grandmother 78    Ovarian cancer Maternal Grandmother        Vital Signs  /80 (BP Location: Left arm, Patient Position: Sitting)   Pulse 88   Ht 175.3 cm (69\")   Wt 68 kg (150 lb)   SpO2 99%   BMI 22.15 kg/m²   Estimated body mass index is 22.15 kg/m² as calculated from the following:    Height as of this encounter: 175.3 cm (69\").    Weight as of this encounter: 68 kg (150 lb).    Physical Exam  Vitals reviewed.   Constitutional:       Appearance: Normal appearance.   Cardiovascular:      Rate and Rhythm: Normal rate and regular rhythm.      Heart sounds: Normal heart sounds. "   Pulmonary:      Effort: Pulmonary effort is normal.      Breath sounds: Normal breath sounds.   Skin:     General: Skin is warm and dry.      Capillary Refill: Capillary refill takes less than 2 seconds.   Neurological:      General: No focal deficit present.      Mental Status: She is alert and oriented to person, place, and time.   Psychiatric:         Mood and Affect: Mood normal.         Behavior: Behavior normal.         Assessment and Plan     Diagnoses and all orders for this visit:    1. Dietary counseling and surveillance  -     Semaglutide-Weight Management (Wegovy) 2.4 MG/0.75ML solution auto-injector; Inject 2.4 mg under the skin into the appropriate area as directed 1 (One) Time Per Week.  Dispense: 3 mL; Refill: 5      Medication refilled.   Return to the office in 6 months for next annual physical, sooner as needed.      Follow Up  Return in about 6 months (around 1/23/2025) for Annual physical.    LAYTON De

## 2024-07-25 DIAGNOSIS — K21.9 GASTROESOPHAGEAL REFLUX DISEASE WITHOUT ESOPHAGITIS: Primary | ICD-10-CM

## 2024-07-25 RX ORDER — OMEPRAZOLE 20 MG/1
20 CAPSULE, DELAYED RELEASE ORAL DAILY
Qty: 90 CAPSULE | Refills: 1 | Status: SHIPPED | OUTPATIENT
Start: 2024-07-25

## 2024-08-20 DIAGNOSIS — J30.1 NON-SEASONAL ALLERGIC RHINITIS DUE TO POLLEN: Primary | ICD-10-CM

## 2024-08-20 RX ORDER — FLUTICASONE PROPIONATE 50 MCG
2 SPRAY, SUSPENSION (ML) NASAL DAILY
Qty: 18.2 ML | Refills: 2 | Status: SHIPPED | OUTPATIENT
Start: 2024-08-20

## 2024-11-05 ENCOUNTER — OFFICE VISIT (OUTPATIENT)
Dept: FAMILY MEDICINE CLINIC | Facility: CLINIC | Age: 49
End: 2024-11-05
Payer: COMMERCIAL

## 2024-11-05 VITALS
WEIGHT: 155 LBS | HEART RATE: 104 BPM | HEIGHT: 69 IN | BODY MASS INDEX: 22.96 KG/M2 | OXYGEN SATURATION: 98 % | DIASTOLIC BLOOD PRESSURE: 82 MMHG | SYSTOLIC BLOOD PRESSURE: 120 MMHG

## 2024-11-05 DIAGNOSIS — M54.41 ACUTE LEFT-SIDED LOW BACK PAIN WITH BILATERAL SCIATICA: Primary | ICD-10-CM

## 2024-11-05 DIAGNOSIS — M54.42 ACUTE LEFT-SIDED LOW BACK PAIN WITH BILATERAL SCIATICA: Primary | ICD-10-CM

## 2024-11-05 PROCEDURE — 99214 OFFICE O/P EST MOD 30 MIN: CPT | Performed by: NURSE PRACTITIONER

## 2024-11-05 RX ORDER — METHYLPREDNISOLONE 4 MG/1
TABLET ORAL
Qty: 21 TABLET | Refills: 0 | Status: SHIPPED | OUTPATIENT
Start: 2024-11-05

## 2024-11-05 NOTE — PROGRESS NOTES
Office Note     Name: Cami Back    : 1975     MRN: [id number]     Chief Complaint  Hip Pain (Pt states she's been having up pain for 3 weeks now. Pt states its happened in the past. Left side hip burns )    Subjective     History of Present Illness:  Cami Bcak is a 48 y.o. female who presents today for left SI joint pain and sciatica into left buttock.     History of Present Illness  The patient is a 48-year-old female who presents for evaluation of left hip pain.    She reports experiencing pain in her left hip, which she attributes to sleeping on her left side due to discomfort in her right shoulder. The pain, described as similar to sciatica, radiates from her left sacroiliac joint down to her buttock and occasionally extends to the back of her leg. She has attempted to alleviate the pain through stretching, Advil, Tylenol, and ice packs, but these measures have not provided significant relief. The pain is severe enough to disrupt her sleep. She recalls a similar episode of hip pain in , for which she underwent physical therapy. She also mentions having had an x-ray at a chiropractor's office years ago. She has tried steroids, which she tolerates well, but they cause her to feel hot and sweaty.    She got a new Mirena IUD in 2024 and has not had periods since then. She did not have this problem with her first IUD. She also experiences hot flashes.      Objective     Past Medical History:   Diagnosis Date    Allergic 2022    Bee allergy    Anxiety      Past Surgical History:   Procedure Laterality Date    EYE SURGERY  1993    Removed cyst from eye     Family History   Problem Relation Age of Onset    Breast cancer Mother 65    Anxiety disorder Mother     Cancer Mother         Breast Cancer    Heart disease Mother     Heart defect Mother     Anxiety disorder Father     Diabetes Father     Heart disease Father     Anxiety disorder Sister     Diabetes Sister      "Hyperlipidemia Sister     Breast cancer Maternal Grandmother 78    Ovarian cancer Maternal Grandmother        Vital Signs  /82 (BP Location: Left arm, Patient Position: Sitting, Cuff Size: Adult)   Pulse 104   Ht 175.3 cm (69\")   Wt 70.3 kg (155 lb)   SpO2 98%   BMI 22.89 kg/m²   Estimated body mass index is 22.89 kg/m² as calculated from the following:    Height as of this encounter: 175.3 cm (69\").    Weight as of this encounter: 70.3 kg (155 lb).    Physical Exam  Vitals reviewed.   Constitutional:       Appearance: Normal appearance.   Cardiovascular:      Rate and Rhythm: Normal rate and regular rhythm.      Heart sounds: Normal heart sounds.   Pulmonary:      Effort: Pulmonary effort is normal.      Breath sounds: Normal breath sounds.   Skin:     General: Skin is warm and dry.      Capillary Refill: Capillary refill takes less than 2 seconds.   Neurological:      General: No focal deficit present.      Mental Status: She is alert and oriented to person, place, and time.   Psychiatric:         Mood and Affect: Mood normal.         Behavior: Behavior normal.        Physical Exam      Results         Assessment and Plan     Diagnoses and all orders for this visit:    1. Acute left-sided low back pain with bilateral sciatica (Primary)  -     XR Spine Lumbar 2 or 3 View (In Office); Future  -     methylPREDNISolone (MEDROL) 4 MG dose pack; Take as directed on package instructions.  Dispense: 21 tablet; Refill: 0      Assessment & Plan  1. Left hip pain.  The left hip pain is likely due to inflammation in the area, possibly related to lumbar-based sciatica. An x-ray of the lumbar area is necessary to confirm this. A Medrol Dosepak has been prescribed to reduce inflammation and alleviate pain, with instructions to start the medication tomorrow morning with food. If the x-ray results are normal, an MRI of the lumbar area will be considered. Ultimately, a referral to a back specialist may be required. "       Follow Up  Return if symptoms worsen or fail to improve.      Patient or patient representative verbalized consent for the use of Ambient Listening during the visit with  LAYTON De for chart documentation. 11/5/2024  14:06 LAYTON Varma

## 2024-11-07 DIAGNOSIS — Z71.3 DIETARY COUNSELING AND SURVEILLANCE: ICD-10-CM

## 2024-11-07 DIAGNOSIS — K21.9 GASTROESOPHAGEAL REFLUX DISEASE WITHOUT ESOPHAGITIS: ICD-10-CM

## 2024-11-07 RX ORDER — SEMAGLUTIDE 2.4 MG/.75ML
2.4 INJECTION, SOLUTION SUBCUTANEOUS WEEKLY
Qty: 3 ML | Refills: 5 | OUTPATIENT
Start: 2024-11-07

## 2024-11-12 DIAGNOSIS — M54.42 ACUTE LEFT-SIDED LOW BACK PAIN WITH BILATERAL SCIATICA: ICD-10-CM

## 2024-11-12 DIAGNOSIS — M54.41 ACUTE LEFT-SIDED LOW BACK PAIN WITH BILATERAL SCIATICA: ICD-10-CM

## 2024-11-12 DIAGNOSIS — K21.9 GASTROESOPHAGEAL REFLUX DISEASE WITHOUT ESOPHAGITIS: ICD-10-CM

## 2024-11-12 DIAGNOSIS — M43.06 PARS DEFECT OF LUMBAR SPINE: Primary | ICD-10-CM

## 2024-11-18 ENCOUNTER — TELEPHONE (OUTPATIENT)
Dept: FAMILY MEDICINE CLINIC | Facility: CLINIC | Age: 49
End: 2024-11-18
Payer: COMMERCIAL

## 2024-11-18 NOTE — TELEPHONE ENCOUNTER
Please let patient know this information... evidently this is a new thing with insurance, as this is the 3rd person within the last week that we've heard this same message for.     She can get this over the counter. [facility] [facility] seems to be the cheapest place to buy it that I've been able to find.

## 2024-11-18 NOTE — TELEPHONE ENCOUNTER
LVM for pt to call us back    HUB TO RELAY     Please let patient know this information... evidently this is a new thing with insurance, as this is the 3rd person within the last week that we've heard this same message for.      She can get this over the counter. [employer] seems to be the cheapest place to buy it that I've been able to find.

## 2024-11-18 NOTE — TELEPHONE ENCOUNTER
"Pt omeprazole had gotten denied, the letter states     \"Your plan covers 90 units of therapy (i.e., 90 doses) per 365 days.Your plan only covers more of this drug (additional quantities) when you meet the criteria for additional quantities\"  "

## 2024-11-18 NOTE — LETTER
"November 19, 2024     Cami Back \"Karen\"  [address] [facility] [facility] [address] [facility] [address]    Patient: Cami Back   YOB: 1975   Date of Visit: 11/18/2024     Dear Cami Back \"Karen\":      We have been trying to reach you in regards to the prescription for Omeprazole.  Unfortunately, your insurance has denied to cover this medication. We have been seeing this more and more recently.    It is available over the counter.  If you have a [facility]'s membership, this has been the cheapest place to get it as reported by patients.     Sincerely,        LAYTON De        CC: No Recipients  "

## 2024-11-19 NOTE — TELEPHONE ENCOUNTER
LVM X 3- LETTER SENT         HUB TO RELAY      Please let patient know this information... evidently this is a new thing with insurance, as this is the 3rd person within the last week that we've heard this same message for.      She can get this over the counter. [employer] seems to be the cheapest place to buy it that I've been able to find.

## 2024-11-19 NOTE — TELEPHONE ENCOUNTER
"Name: Cami Back \"Karen\"      Relationship: Self      Best Callback Number: [phone]       HUB PROVIDED THE RELAY MESSAGE FROM THE OFFICE      PATIENT: VOICED UNDERSTANDING AND HAS NO FURTHER QUESTIONS AT THIS TIME    ADDITIONAL INFORMATION:   "

## 2024-12-18 ENCOUNTER — PATIENT MESSAGE (OUTPATIENT)
Dept: FAMILY MEDICINE CLINIC | Facility: CLINIC | Age: 49
End: 2024-12-18
Payer: COMMERCIAL

## 2025-01-02 DIAGNOSIS — Z71.3 DIETARY COUNSELING AND SURVEILLANCE: ICD-10-CM

## 2025-01-02 RX ORDER — SEMAGLUTIDE 2.4 MG/.75ML
1 INJECTION, SOLUTION SUBCUTANEOUS WEEKLY
Qty: 4 ML | Refills: 0 | Status: SHIPPED | OUTPATIENT
Start: 2025-01-02

## 2025-01-07 DIAGNOSIS — F41.1 GENERALIZED ANXIETY DISORDER: ICD-10-CM

## 2025-01-07 RX ORDER — ESCITALOPRAM OXALATE 10 MG/1
10 TABLET ORAL DAILY
Qty: 90 TABLET | Refills: 0 | OUTPATIENT
Start: 2025-01-07

## 2025-01-08 DIAGNOSIS — F41.1 GENERALIZED ANXIETY DISORDER: ICD-10-CM

## 2025-01-08 RX ORDER — ESCITALOPRAM OXALATE 10 MG/1
10 TABLET ORAL DAILY
Qty: 30 TABLET | Refills: 0 | Status: SHIPPED | OUTPATIENT
Start: 2025-01-08

## 2025-01-10 NOTE — TELEPHONE ENCOUNTER
Contacted pt, she will be out on 1/16/25, please send in enough to get through to appt on 1/20/25.  
M      HUB TO RELAY    Patient has an upcoming appointment 1/20/25 - does she have enough medication to last until then? If not, I'll send in a supply to cover her until the appointment.   
Patient has an upcoming appointment 1/20/25 - does she have enough medication to last until then? If not, I'll send in a supply to cover her until the appointment.   
There was a second refill request sent in by the patient with this information, I sent in a 30-day supply on 1/8/25.  
83.1

## 2025-01-20 ENCOUNTER — OFFICE VISIT (OUTPATIENT)
Dept: FAMILY MEDICINE CLINIC | Facility: CLINIC | Age: 50
End: 2025-01-20
Payer: COMMERCIAL

## 2025-01-20 VITALS
DIASTOLIC BLOOD PRESSURE: 74 MMHG | SYSTOLIC BLOOD PRESSURE: 126 MMHG | HEIGHT: 69 IN | OXYGEN SATURATION: 98 % | HEART RATE: 86 BPM | WEIGHT: 164 LBS | BODY MASS INDEX: 24.29 KG/M2

## 2025-01-20 DIAGNOSIS — Z83.2 FAMILY HISTORY OF AUTOIMMUNE DISORDER: ICD-10-CM

## 2025-01-20 DIAGNOSIS — I73.00 RAYNAUD'S PHENOMENON WITHOUT GANGRENE: ICD-10-CM

## 2025-01-20 DIAGNOSIS — Z13.29 SCREENING FOR THYROID DISORDER: ICD-10-CM

## 2025-01-20 DIAGNOSIS — K21.9 GASTROESOPHAGEAL REFLUX DISEASE WITHOUT ESOPHAGITIS: ICD-10-CM

## 2025-01-20 DIAGNOSIS — F41.1 GENERALIZED ANXIETY DISORDER: ICD-10-CM

## 2025-01-20 DIAGNOSIS — Z13.21 ENCOUNTER FOR VITAMIN DEFICIENCY SCREENING: ICD-10-CM

## 2025-01-20 DIAGNOSIS — Z00.00 ANNUAL PHYSICAL EXAM: Primary | ICD-10-CM

## 2025-01-20 DIAGNOSIS — Z13.220 SCREENING FOR LIPID DISORDERS: ICD-10-CM

## 2025-01-20 DIAGNOSIS — Z71.3 DIETARY COUNSELING AND SURVEILLANCE: ICD-10-CM

## 2025-01-20 PROCEDURE — 99214 OFFICE O/P EST MOD 30 MIN: CPT | Performed by: NURSE PRACTITIONER

## 2025-01-20 PROCEDURE — 99396 PREV VISIT EST AGE 40-64: CPT | Performed by: NURSE PRACTITIONER

## 2025-01-20 RX ORDER — SEMAGLUTIDE 2.4 MG/.75ML
1 INJECTION, SOLUTION SUBCUTANEOUS WEEKLY
Qty: 3 ML | Refills: 2 | Status: SHIPPED | OUTPATIENT
Start: 2025-01-20

## 2025-01-20 RX ORDER — BUSPIRONE HYDROCHLORIDE 5 MG/1
5 TABLET ORAL 3 TIMES DAILY
Qty: 90 TABLET | Refills: 0 | Status: SHIPPED | OUTPATIENT
Start: 2025-01-20

## 2025-01-20 RX ORDER — ESCITALOPRAM OXALATE 10 MG/1
10 TABLET ORAL DAILY
Qty: 90 TABLET | Refills: 3 | Status: SHIPPED | OUTPATIENT
Start: 2025-01-20

## 2025-01-20 NOTE — PROGRESS NOTES
Office Note     Name: Cami Back    : 1975     MRN: [id number]     Chief Complaint  Annual Exam (Pt is here for an annual physical )    Subjective     History of Present Illness:  Cami Back is a 49 y.o. female who presents today for annual physical.     History of Present Illness  The patient presents for an annual physical exam, anxiety, weight management, and Raynaud's disease.    She has declined the influenza vaccine during this visit. She suspects a previous COVID-19 infection earlier this year, although no formal testing was conducted. She is under the care of Chelsea Cortes for gynecological examinations, including Pap smears. Her last Pap smear was performed in 10/2024, coinciding with the insertion of an intrauterine device (IUD). A mammogram conducted in 2024 yielded normal results. She has expressed interest in having laboratory tests conducted today. She has been supplementing her diet with vitamin B12, but has recently reduced the frequency to every other day due to elevated levels. She anticipates that her insurance provider will require approval for the continuation of her Wegovy. She has been extending the use of her medications to approximately 10 days in anticipation of potential coverage issues. She experiences intermittent hip discomfort, which has shown some improvement. She also reports alternating pain in her shoulder and hip, necessitating frequent position changes during sleep. She has a history of Raynaud's disease and occasionally experiences numbness in her toes when seated. She speculates that this could be related to her back condition. She also reports episodes of cold toes, which are alleviated by using a heated stool at her desk. She has previously had her rheumatoid arthritis (RA) factor tested, which was found to be elevated. However, she does not report any symptoms suggestive of RA, such as fatigue, flares, joint pain, or joint swelling. She has  "noticed an unusual rash on her left eyelid, which she attributes to either a cosmetic product or an allergic reaction. She has since changed her makeup products, resulting in an improvement of the rash.    She is currently on a daily regimen of omeprazole, which she reports as effective. She procures this medication through [employer], finding it more economical than over-the-counter alternatives. She uses Flonase on an as-needed basis, typically during seasonal changes, but is not currently using it.    She is also taking Lexapro and has requested an additional medication to manage anxiety during particularly challenging days following her mother's recent Lymphoma diagnosis. She is considering the possibility of increasing the Lexapro dosage.    FAMILY HISTORY  Her mother has a history of breast cancer and is currently undergoing treatment for stage III lymphoma. Her sister has lupus and rheumatoid arthritis.    MEDICATIONS  Current: omeprazole, Flonase, Lexapro, Wegovy    IMMUNIZATIONS  She received a tetanus booster in 2016.      Objective     Past Medical History:   Diagnosis Date    Allergic 8/2022    Bee allergy    Anxiety      Past Surgical History:   Procedure Laterality Date    EYE SURGERY  2/1993    Removed cyst from eye     Family History   Problem Relation Age of Onset    Breast cancer Mother 65    Anxiety disorder Mother     Cancer Mother         Breast Cancer    Heart disease Mother     Heart defect Mother     Other (Peripheral T-cell lymphoma) Mother     Anxiety disorder Father     Diabetes Father     Heart disease Father     Anxiety disorder Sister     Diabetes Sister     Hyperlipidemia Sister     Breast cancer Maternal Grandmother 78    Ovarian cancer Maternal Grandmother        Vital Signs  /74 (BP Location: Left arm, Patient Position: Sitting)   Pulse 86   Ht 175.3 cm (69\")   Wt 74.4 kg (164 lb)   SpO2 98%   BMI 24.22 kg/m²   Estimated body mass index is 24.22 kg/m² as calculated from the " "following:    Height as of this encounter: 175.3 cm (69\").    Weight as of this encounter: 74.4 kg (164 lb).    Physical Exam  Vitals reviewed.   Constitutional:       Appearance: Normal appearance.   Cardiovascular:      Rate and Rhythm: Normal rate and regular rhythm.      Heart sounds: Normal heart sounds.   Pulmonary:      Effort: Pulmonary effort is normal.      Breath sounds: Normal breath sounds.   Skin:     General: Skin is warm and dry.   Neurological:      General: No focal deficit present.      Mental Status: She is alert and oriented to person, place, and time.   Psychiatric:         Mood and Affect: Mood normal.         Behavior: Behavior normal.        Physical Exam  Lungs were auscultated.  Heart was examined.    Vital Signs  Blood pressure is normal.    Results  Laboratory Studies  B12 was slightly high last year.    Imaging  Mammogram conducted in 11/2024 yielded normal results.       Assessment and Plan     Diagnoses and all orders for this visit:    1. Annual physical exam (Primary)  -     CBC & Differential  -     Comprehensive Metabolic Panel  -     Lipid Panel  -     TSH  -     Vitamin B12  -     Vitamin D,25-Hydroxy  -     Folate  -     T4, free  -     Magnesium    2. Encounter for vitamin deficiency screening  -     Vitamin B12  -     Vitamin D,25-Hydroxy  -     Folate  -     Magnesium    3. Screening for lipid disorders  -     Lipid Panel    4. Screening for thyroid disorder  -     TSH  -     T4, free    5. Dietary counseling and surveillance  -     Semaglutide-Weight Management (Wegovy) 2.4 MG/0.75ML solution auto-injector; Inject 1 syringe under the skin into the appropriate area as directed 1 (One) Time Per Week.  Dispense: 3 mL; Refill: 2    6. Gastroesophageal reflux disease without esophagitis  -     omeprazole (priLOSEC) 20 MG capsule; Take 1 capsule by mouth Daily.  Dispense: 90 capsule; Refill: 3    7. Generalized anxiety disorder  -     escitalopram (LEXAPRO) 10 MG tablet; Take 1 " tablet by mouth Daily.  Dispense: 90 tablet; Refill: 3  -     busPIRone (BUSPAR) 5 MG tablet; Take 1 tablet by mouth 3 (Three) Times a Day.  Dispense: 90 tablet; Refill: 0    8. Raynaud's phenomenon without gangrene    9. Family history of autoimmune disorder      Assessment & Plan  1. Annual physical examination.  Her blood pressure readings are within the normal range. She has a history of Raynaud's disease and occasionally experiences numbness in her toes when seated. She speculates that this could be related to her back condition. She also reports episodes of cold toes, which are alleviated by using a heated stool at her desk. She has previously had her rheumatoid arthritis (RA) factor tested, which was found to be elevated. However, she does not report any symptoms suggestive of RA, such as fatigue, flares, joint pain, or joint swelling. She has noticed an unusual rash on her left eyelid, which she attributes to either a cosmetic product or an allergic reaction. She has since changed her makeup products, resulting in an improvement of the rash. A request for her medical records, including her mammogram report, will be submitted. Laboratory tests will be conducted today. She has been informed that she is due for a tetanus booster in 2026. She has declined the influenza vaccine during this visit. She has been advised to monitor for any symptoms and report them promptly.    2. Anxiety.  She will continue her current regimen of Lexapro 10 mg. A prescription for BuSpar 5 mg has been provided, with instructions to take one tablet up to three times daily as needed. She has been reassured that there are no known interactions between Lexapro and BuSpar.    3. Weight management.  A refill for her Wegovy prescription has been provided. The process for insurance approval has been explained, and necessary paperwork will be completed upon receipt.    4. Medication management.  Refills for her omeprazole prescription have  been provided. She obtains this medication through [employer] as it is cheaper than buying over the counter.      Follow Up  Return in about 1 year (around 1/20/2026) for Annual physical.      Patient or patient representative verbalized consent for the use of Ambient Listening during the visit with  LAYTON De for chart documentation. 1/20/2025  10:22 EST    LAYTON De

## 2025-01-21 DIAGNOSIS — R17 ELEVATED BILIRUBIN: Primary | ICD-10-CM

## 2025-01-21 LAB
25(OH)D3+25(OH)D2 SERPL-MCNC: 53.4 NG/ML (ref 30–100)
ALBUMIN SERPL-MCNC: 4.4 G/DL (ref 3.9–4.9)
ALP SERPL-CCNC: 121 IU/L (ref 44–121)
ALT SERPL-CCNC: 23 IU/L (ref 0–32)
AST SERPL-CCNC: 24 IU/L (ref 0–40)
BASOPHILS # BLD AUTO: 0 X10E3/UL (ref 0–0.2)
BASOPHILS NFR BLD AUTO: 0 %
BILIRUB SERPL-MCNC: 1.7 MG/DL (ref 0–1.2)
BUN SERPL-MCNC: 15 MG/DL (ref 6–24)
BUN/CREAT SERPL: 20 (ref 9–23)
CALCIUM SERPL-MCNC: 9.4 MG/DL (ref 8.7–10.2)
CHLORIDE SERPL-SCNC: 98 MMOL/L (ref 96–106)
CHOLEST SERPL-MCNC: 160 MG/DL (ref 100–199)
CO2 SERPL-SCNC: 24 MMOL/L (ref 20–29)
CREAT SERPL-MCNC: 0.75 MG/DL (ref 0.57–1)
EGFRCR SERPLBLD CKD-EPI 2021: 98 ML/MIN/1.73
EOSINOPHIL # BLD AUTO: 0.1 X10E3/UL (ref 0–0.4)
EOSINOPHIL NFR BLD AUTO: 1 %
ERYTHROCYTE [DISTWIDTH] IN BLOOD BY AUTOMATED COUNT: 11.5 % (ref 11.7–15.4)
FOLATE SERPL-MCNC: >20 NG/ML
GLOBULIN SER CALC-MCNC: 2.3 G/DL (ref 1.5–4.5)
GLUCOSE SERPL-MCNC: 79 MG/DL (ref 70–99)
HCT VFR BLD AUTO: 46.6 % (ref 34–46.6)
HDLC SERPL-MCNC: 70 MG/DL
HGB BLD-MCNC: 15.5 G/DL (ref 11.1–15.9)
IMM GRANULOCYTES # BLD AUTO: 0 X10E3/UL (ref 0–0.1)
IMM GRANULOCYTES NFR BLD AUTO: 0 %
LDLC SERPL CALC-MCNC: 77 MG/DL (ref 0–99)
LYMPHOCYTES # BLD AUTO: 1.6 X10E3/UL (ref 0.7–3.1)
LYMPHOCYTES NFR BLD AUTO: 20 %
MAGNESIUM SERPL-MCNC: 2 MG/DL (ref 1.6–2.3)
MCH RBC QN AUTO: 32.2 PG (ref 26.6–33)
MCHC RBC AUTO-ENTMCNC: 33.3 G/DL (ref 31.5–35.7)
MCV RBC AUTO: 97 FL (ref 79–97)
MONOCYTES # BLD AUTO: 0.5 X10E3/UL (ref 0.1–0.9)
MONOCYTES NFR BLD AUTO: 7 %
NEUTROPHILS # BLD AUTO: 5.6 X10E3/UL (ref 1.4–7)
NEUTROPHILS NFR BLD AUTO: 72 %
PLATELET # BLD AUTO: 248 X10E3/UL (ref 150–450)
POTASSIUM SERPL-SCNC: 4.4 MMOL/L (ref 3.5–5.2)
PROT SERPL-MCNC: 6.7 G/DL (ref 6–8.5)
RBC # BLD AUTO: 4.82 X10E6/UL (ref 3.77–5.28)
SODIUM SERPL-SCNC: 137 MMOL/L (ref 134–144)
T4 FREE SERPL-MCNC: 1.06 NG/DL (ref 0.82–1.77)
TRIGL SERPL-MCNC: 65 MG/DL (ref 0–149)
TSH SERPL DL<=0.005 MIU/L-ACNC: 0.93 UIU/ML (ref 0.45–4.5)
VIT B12 SERPL-MCNC: 773 PG/ML (ref 232–1245)
VLDLC SERPL CALC-MCNC: 13 MG/DL (ref 5–40)
WBC # BLD AUTO: 7.9 X10E3/UL (ref 3.4–10.8)

## 2025-02-07 ENCOUNTER — LAB (OUTPATIENT)
Dept: FAMILY MEDICINE CLINIC | Facility: CLINIC | Age: 50
End: 2025-02-07
Payer: COMMERCIAL

## 2025-02-07 DIAGNOSIS — R17 ELEVATED BILIRUBIN: ICD-10-CM

## 2025-02-08 LAB
ALBUMIN SERPL-MCNC: 4.5 G/DL (ref 3.9–4.9)
ALP SERPL-CCNC: 117 IU/L (ref 44–121)
ALT SERPL-CCNC: 15 IU/L (ref 0–32)
AST SERPL-CCNC: 17 IU/L (ref 0–40)
BILIRUB DIRECT SERPL-MCNC: 0.23 MG/DL (ref 0–0.4)
BILIRUB INDIRECT SERPL-MCNC: 0.47 MG/DL (ref 0.1–0.8)
BILIRUB SERPL-MCNC: 0.7 MG/DL (ref 0–1.2)
BUN SERPL-MCNC: 11 MG/DL (ref 6–24)
BUN/CREAT SERPL: 14 (ref 9–23)
CALCIUM SERPL-MCNC: 9.5 MG/DL (ref 8.7–10.2)
CHLORIDE SERPL-SCNC: 102 MMOL/L (ref 96–106)
CO2 SERPL-SCNC: 25 MMOL/L (ref 20–29)
CREAT SERPL-MCNC: 0.78 MG/DL (ref 0.57–1)
EGFRCR SERPLBLD CKD-EPI 2021: 93 ML/MIN/1.73
GLOBULIN SER CALC-MCNC: 1.9 G/DL (ref 1.5–4.5)
GLUCOSE SERPL-MCNC: 86 MG/DL (ref 70–99)
POTASSIUM SERPL-SCNC: 4.4 MMOL/L (ref 3.5–5.2)
PROT SERPL-MCNC: 6.4 G/DL (ref 6–8.5)
SODIUM SERPL-SCNC: 139 MMOL/L (ref 134–144)

## 2025-02-16 DIAGNOSIS — F41.1 GENERALIZED ANXIETY DISORDER: ICD-10-CM

## 2025-02-17 RX ORDER — BUSPIRONE HYDROCHLORIDE 5 MG/1
5 TABLET ORAL 3 TIMES DAILY
Qty: 90 TABLET | Refills: 0 | Status: SHIPPED | OUTPATIENT
Start: 2025-02-17

## 2025-02-17 NOTE — TELEPHONE ENCOUNTER
Rx Refill Note  Requested Prescriptions     Pending Prescriptions Disp Refills    busPIRone (BUSPAR) 5 MG tablet [Pharmacy Med Name: busPIRone HCl 5 MG Oral Tablet] 90 tablet 0     Sig: TAKE 1 TABLET BY MOUTH THREE TIMES DAILY      Last office visit with prescribing clinician: 1/20/2025   Last telemedicine visit with prescribing clinician: Visit date not found   Next office visit with prescribing clinician: 1/22/2026                         Would you like a call back once the refill request has been completed: [] Yes [] No    If the office needs to give you a call back, can they leave a voicemail: [] Yes [] No    Haydee Diaz MA  02/17/25, 11:55 EST

## 2025-03-18 RX ORDER — ALBUTEROL SULFATE 90 UG/1
2 INHALANT RESPIRATORY (INHALATION) EVERY 4 HOURS PRN
Qty: 18 G | Refills: 1 | Status: SHIPPED | OUTPATIENT
Start: 2025-03-18

## 2025-04-16 ENCOUNTER — PATIENT MESSAGE (OUTPATIENT)
Dept: FAMILY MEDICINE CLINIC | Facility: CLINIC | Age: 50
End: 2025-04-16
Payer: COMMERCIAL

## 2025-04-16 VITALS — BODY MASS INDEX: 23.04 KG/M2 | WEIGHT: 156 LBS

## 2025-05-01 ENCOUNTER — TELEPHONE (OUTPATIENT)
Dept: FAMILY MEDICINE CLINIC | Facility: CLINIC | Age: 50
End: 2025-05-01
Payer: COMMERCIAL

## 2025-05-01 DIAGNOSIS — G89.29 CHRONIC LEFT-SIDED LOW BACK PAIN WITH BILATERAL SCIATICA: Primary | ICD-10-CM

## 2025-05-01 DIAGNOSIS — M43.06 PARS DEFECT OF LUMBAR SPINE: ICD-10-CM

## 2025-05-01 DIAGNOSIS — M54.42 CHRONIC LEFT-SIDED LOW BACK PAIN WITH BILATERAL SCIATICA: Primary | ICD-10-CM

## 2025-05-01 DIAGNOSIS — M54.41 CHRONIC LEFT-SIDED LOW BACK PAIN WITH BILATERAL SCIATICA: Primary | ICD-10-CM

## 2025-05-01 NOTE — TELEPHONE ENCOUNTER
Patient had a MRI order placed almost 6 months ago but did not have done. She is now having more pain and wanting to have the MRI completed. Can you enter new order or does she need to come in for a visit? Thanks.

## 2025-05-15 DIAGNOSIS — J30.1 NON-SEASONAL ALLERGIC RHINITIS DUE TO POLLEN: ICD-10-CM

## 2025-05-16 ENCOUNTER — TELEPHONE (OUTPATIENT)
Dept: FAMILY MEDICINE CLINIC | Facility: CLINIC | Age: 50
End: 2025-05-16
Payer: COMMERCIAL

## 2025-05-16 RX ORDER — FLUTICASONE PROPIONATE 50 MCG
2 SPRAY, SUSPENSION (ML) NASAL DAILY
Qty: 16 G | Refills: 2 | Status: SHIPPED | OUTPATIENT
Start: 2025-05-16

## 2025-06-07 DIAGNOSIS — Z71.3 DIETARY COUNSELING AND SURVEILLANCE: ICD-10-CM

## 2025-06-09 RX ORDER — SEMAGLUTIDE 2.4 MG/.75ML
INJECTION, SOLUTION SUBCUTANEOUS
Qty: 4 ML | Refills: 0 | Status: SHIPPED | OUTPATIENT
Start: 2025-06-09

## 2025-06-11 DIAGNOSIS — G89.29 CHRONIC LEFT-SIDED LOW BACK PAIN WITH BILATERAL SCIATICA: ICD-10-CM

## 2025-06-11 DIAGNOSIS — M54.42 CHRONIC LEFT-SIDED LOW BACK PAIN WITH BILATERAL SCIATICA: ICD-10-CM

## 2025-06-11 DIAGNOSIS — M43.06 PARS DEFECT OF LUMBAR SPINE: ICD-10-CM

## 2025-06-11 DIAGNOSIS — M54.41 CHRONIC LEFT-SIDED LOW BACK PAIN WITH BILATERAL SCIATICA: ICD-10-CM

## 2025-06-12 DIAGNOSIS — M43.06 PARS DEFECT OF LUMBAR SPINE: ICD-10-CM

## 2025-06-12 DIAGNOSIS — M54.41 CHRONIC LEFT-SIDED LOW BACK PAIN WITH BILATERAL SCIATICA: Primary | ICD-10-CM

## 2025-06-12 DIAGNOSIS — G89.29 CHRONIC LEFT-SIDED LOW BACK PAIN WITH BILATERAL SCIATICA: Primary | ICD-10-CM

## 2025-06-12 DIAGNOSIS — M54.42 CHRONIC LEFT-SIDED LOW BACK PAIN WITH BILATERAL SCIATICA: Primary | ICD-10-CM

## 2025-07-03 DIAGNOSIS — Z71.3 DIETARY COUNSELING AND SURVEILLANCE: ICD-10-CM

## 2025-07-03 RX ORDER — SEMAGLUTIDE 2.4 MG/.75ML
INJECTION, SOLUTION SUBCUTANEOUS
Qty: 4 ML | Refills: 0 | Status: SHIPPED | OUTPATIENT
Start: 2025-07-03

## 2025-07-10 ENCOUNTER — OFFICE VISIT (OUTPATIENT)
Dept: NEUROSURGERY | Facility: CLINIC | Age: 50
End: 2025-07-10
Payer: COMMERCIAL

## 2025-07-10 VITALS — HEIGHT: 69 IN | BODY MASS INDEX: 25.33 KG/M2 | WEIGHT: 171 LBS | RESPIRATION RATE: 18 BRPM

## 2025-07-10 DIAGNOSIS — M54.16 LUMBAR RADICULOPATHY: Primary | ICD-10-CM

## 2025-07-10 DIAGNOSIS — M51.9 LUMBAR DISC DISEASE: ICD-10-CM

## 2025-07-10 DIAGNOSIS — M43.16 SPONDYLOLISTHESIS OF LUMBAR REGION: ICD-10-CM

## 2025-07-10 PROCEDURE — 99204 OFFICE O/P NEW MOD 45 MIN: CPT | Performed by: NEUROLOGICAL SURGERY

## 2025-07-10 NOTE — PROGRESS NOTES
NAME: PHIL MESA   DOS: 7/10/2025  : 1975  PCP: Ashley Arevalo APRN    Chief Complaint:    Chief Complaint   Patient presents with    LOW BACK & LEFT BUTTOCK PAIN       History of Present Illness:  49 y.o. female   This 49-year-old female neurosurgical consultation she is a schoolteacher an avid farmer she is accompanied by her .  She reports a history of chronic axial spinal pain been ever present for years she has noted intermittent left-sided sciatic leg pain that has been quite severe in the past most recently in the last few months its gotten worse she reports lateral iliotibial band pain left-sided trochanteric bursal pain with radiation down to the foot she denies cauda equina syndrome she has been PT is quite active    PMHX  Allergies:  Allergies   Allergen Reactions    Bee Venom Swelling     Medications    Current Outpatient Medications:     albuterol sulfate  (90 Base) MCG/ACT inhaler, Inhale 2 puffs Every 4 (Four) Hours As Needed for Wheezing., Disp: 18 g, Rfl: 1    busPIRone (BUSPAR) 5 MG tablet, TAKE 1 TABLET BY MOUTH THREE TIMES DAILY, Disp: 90 tablet, Rfl: 0    escitalopram (LEXAPRO) 10 MG tablet, Take 1 tablet by mouth Daily., Disp: 90 tablet, Rfl: 3    fluticasone (FLONASE) 50 MCG/ACT nasal spray, Administer 2 sprays into the nostril(s) as directed by provider Daily., Disp: 16 g, Rfl: 2    omeprazole (priLOSEC) 20 MG capsule, Take 1 capsule by mouth Daily., Disp: 90 capsule, Rfl: 3    Wegovy 2.4 MG/0.75ML solution auto-injector, INJECT 2.4 MG SUBCUTANEOUSLY INTO THE APPROPRIATE AREA AS DIRECTED ONCE A WEEK, Disp: 4 mL, Rfl: 0  Past Medical History:  Past Medical History:   Diagnosis Date    Allergic 2022    Bee allergy    Anxiety      Past Surgical History:  Past Surgical History:   Procedure Laterality Date    EYE SURGERY  1993    Removed cyst from eye     Social Hx:  Social History     Tobacco Use    Smoking status: Never     Passive exposure: Never     Smokeless tobacco: Never   Vaping Use    Vaping status: Never Used   Substance Use Topics    Alcohol use: Yes     Alcohol/week: 2.0 standard drinks of alcohol     Types: 2 Drinks containing 0.5 oz of alcohol per week     Comment: occasionally    Drug use: Never     Family Hx:  Family History   Problem Relation Age of Onset    Breast cancer Mother 65    Anxiety disorder Mother     Cancer Mother         Breast Cancer    Heart disease Mother     Heart defect Mother     Other (Peripheral T-cell lymphoma) Mother     Anxiety disorder Father     Diabetes Father     Heart disease Father     Anxiety disorder Sister     Diabetes Sister     Hyperlipidemia Sister     Breast cancer Maternal Grandmother 78    Ovarian cancer Maternal Grandmother      Review of Systems:        Review of Systems   Constitutional:  Negative for activity change, appetite change, chills, diaphoresis, fatigue, fever and unexpected weight change.   HENT:  Negative for congestion, dental problem, drooling, ear discharge, ear pain, facial swelling, hearing loss, mouth sores, nosebleeds, postnasal drip, rhinorrhea, sinus pressure, sneezing, sore throat, tinnitus, trouble swallowing and voice change.    Eyes:  Negative for photophobia, pain, discharge, redness, itching and visual disturbance.   Respiratory:  Negative for apnea, cough, choking, chest tightness, shortness of breath, wheezing and stridor.    Cardiovascular:  Negative for chest pain, palpitations and leg swelling.   Gastrointestinal:  Negative for abdominal distention, abdominal pain, anal bleeding, blood in stool, constipation, diarrhea, nausea, rectal pain and vomiting.   Endocrine: Negative for cold intolerance, heat intolerance, polydipsia, polyphagia and polyuria.   Genitourinary:  Negative for decreased urine volume, difficulty urinating, dysuria, enuresis, flank pain, frequency, genital sores, hematuria and urgency.   Musculoskeletal:  Positive for back pain. Negative for arthralgias,  gait problem, joint swelling, myalgias, neck pain and neck stiffness.   Skin:  Negative for color change, pallor, rash and wound.   Allergic/Immunologic: Negative for environmental allergies, food allergies and immunocompromised state.   Neurological:  Negative for dizziness, tremors, seizures, syncope, facial asymmetry, speech difficulty, weakness, light-headedness, numbness and headaches.   Hematological:  Negative for adenopathy. Does not bruise/bleed easily.   Psychiatric/Behavioral:  Negative for agitation, behavioral problems, confusion, decreased concentration, dysphoric mood, hallucinations, self-injury, sleep disturbance and suicidal ideas. The patient is not nervous/anxious and is not hyperactive.    All other systems reviewed and are negative.     I have reviewed this note template and all pertinent parts of the review of systems social, family history, surgical history and medication list      Physical Examination:  Vitals:    07/10/25 1001   Resp: 18      General Appearance:   Well developed, well nourished, well groomed, alert, and cooperative.  Neurological examination:  Neurological Exam   Vital signs were reviewed and documented in the chart  Patient appeared in good neurologic function with normal comprehension fluent speech  Mood and affect are normal  Sense of smell deferred  Cranial nerves intact  Muscle bulk and tone normal  5 out of 5 strength no motor drift  Gait normal intact  Negative Romberg  No clonus long tract signs or myelopathy    Reflexes symmetric  No edema noted and extremities skin appears normal  Tenderness to palpation left trochanteric bursa  Straight leg raise sign absent  No signs of intrinsic hip dysfunction  Back is without any lesions or abnormality  Feet are warm and well perfused        Review of Imaging/DATA:  Personally viewed and interpreted MRI of the lumbar spine she is got a grade 1 spondylolisthesis L5 on 1 with spondylolysis central canal is widely patent she is  got multilevel degenerative changes with a central canal that is open*changes no evidence of edema  Diagnoses/Plan:    Ms. Back is a 49 y.o. female   1.  Chronic spondylolysis    2.  Chronic axial back pain    3.  Left-sided intermittent radiculopathy likely through intraforaminal L5 compression    4.  Very active lifestyle    I explained the risk benefits and expected outcome of major elective surgery for their problem, complications from approach, and infection, the risk of neurologic implications after surgery as well as need for repeat surgeries and most importantly failure to achieve quality of life improvement from the surgery to the patient.  I talked about the role of lumbar fusion versus anterior posterior approach as I think a simple TLIF from a posterior approach would be more than reasonable and I explained what the recovery would look like as well as the risk.  I tried to temper my enthusiasm given her high functional capacity but I do think it will likely help with her symptoms given the degree of compression    After extensive discussion shared decision making the plan will be    1.  Lumbar flexion-extension film 90 days    2.  I think PT is reasonable    3.  Work with the interventional pain management team, made a referral for evaluation of some temporizing measures    I will make arrangements for follow-up I be more than happy to consider surgical intervention at any time should her symptoms get worse.

## 2025-07-29 DIAGNOSIS — Z71.3 DIETARY COUNSELING AND SURVEILLANCE: ICD-10-CM

## 2025-07-29 RX ORDER — SEMAGLUTIDE 2.4 MG/.75ML
INJECTION, SOLUTION SUBCUTANEOUS
Qty: 4 ML | Refills: 0 | Status: SHIPPED | OUTPATIENT
Start: 2025-07-29

## 2025-08-20 DIAGNOSIS — Z71.3 DIETARY COUNSELING AND SURVEILLANCE: ICD-10-CM

## 2025-08-20 RX ORDER — SEMAGLUTIDE 2.4 MG/.75ML
INJECTION, SOLUTION SUBCUTANEOUS
Qty: 4 ML | Refills: 0 | Status: SHIPPED | OUTPATIENT
Start: 2025-08-20